# Patient Record
Sex: MALE | Race: WHITE | Employment: FULL TIME | URBAN - METROPOLITAN AREA
[De-identification: names, ages, dates, MRNs, and addresses within clinical notes are randomized per-mention and may not be internally consistent; named-entity substitution may affect disease eponyms.]

---

## 2019-09-17 ENCOUNTER — OFFICE VISIT (OUTPATIENT)
Dept: FAMILY MEDICINE CLINIC | Facility: CLINIC | Age: 24
End: 2019-09-17

## 2019-09-17 VITALS
RESPIRATION RATE: 16 BRPM | OXYGEN SATURATION: 98 % | WEIGHT: 206 LBS | BODY MASS INDEX: 32.33 KG/M2 | HEART RATE: 70 BPM | SYSTOLIC BLOOD PRESSURE: 120 MMHG | DIASTOLIC BLOOD PRESSURE: 70 MMHG | HEIGHT: 67 IN | TEMPERATURE: 98 F

## 2019-09-17 DIAGNOSIS — L73.1 INGROWN HAIR: ICD-10-CM

## 2019-09-17 DIAGNOSIS — L73.9 FOLLICULITIS: Primary | ICD-10-CM

## 2019-09-17 PROCEDURE — 99202 OFFICE O/P NEW SF 15 MIN: CPT | Performed by: NURSE PRACTITIONER

## 2019-09-17 RX ORDER — SULFAMETHOXAZOLE AND TRIMETHOPRIM 800; 160 MG/1; MG/1
1 TABLET ORAL 2 TIMES DAILY
Qty: 20 TABLET | Refills: 0 | Status: SHIPPED | OUTPATIENT
Start: 2019-09-17 | End: 2019-09-27

## 2019-09-17 NOTE — PROGRESS NOTES
CHIEF COMPLAINT:     Patient presents with:  Derm Problem: ingrown hair, painful, redness x4 day    HPI:   Isaac Bai. is a 25year old male who presents with complaints of \"an ingrown hair in his groin area\" that had started 4 days ago.  It starte SKIN: 4x4 cm erythematous, raised abscess on the right suprapubic area.   Minimal fluctuance with small amount of pus/blood drainage when pressure applied, warm to touch   HEAD: atraumatic, normocephalic  LUNGS: clear to auscultation bilaterally, no wheezes Sores often go away in a few days with no treatment. In some cases, medicine may be given. A small piece of skin or pus may be taken to find the type of bacteria causing the infection.   Home care  The healthcare provider may prescribe an antibiotic cream o © 4401-5539 [employer] [address] [facility] [place], [facility] [place]. All rights reserved. This information is not intended as a substitute for professional medical care. Always follow your healthcare professional's instructions.

## 2019-09-17 NOTE — PATIENT INSTRUCTIONS
-warm compresses   -topical antibiotic  -oral antibiotic as prescribed (no drinking while on antibiotic)  -Tylenol/Motrin as needed for pain      Folliculitis  Folliculitis is an inflammation of a hair follicle.  A hair follicle is the little pocket where · If the sores leak fluid, cover the area with a nonstick gauze bandage. Use as little tape as possible. Carefully discard all soiled bandages. · Dress in loose cotton clothing. · Change sheets and blankets if they are soiled by pus.  Wash all clothes, to

## 2019-09-25 ENCOUNTER — TELEPHONE (OUTPATIENT)
Dept: FAMILY MEDICINE CLINIC | Facility: CLINIC | Age: 24
End: 2019-09-25

## 2019-09-25 NOTE — TELEPHONE ENCOUNTER
Pt was seen in [facility] on 9/17 for groin abscess. Called to inquire if he needs longer course of antibiotics; has 2 days of bactrim remaining. Reports area no longer red; decreased to size of a jenni; mild discomfort.   Advised pt to be re-evaluated after comp

## (undated) NOTE — LETTER
Date: 9/17/2019    Patient Name: Danica Pak. To Whom it may concern: This letter has been written at the patient's request. The above patient was seen at the [facility] for treatment of a medical condition.     This patient sh